# Patient Record
Sex: FEMALE | Employment: FULL TIME | URBAN - METROPOLITAN AREA
[De-identification: names, ages, dates, MRNs, and addresses within clinical notes are randomized per-mention and may not be internally consistent; named-entity substitution may affect disease eponyms.]

---

## 2024-10-28 ENCOUNTER — HOSPITAL ENCOUNTER (OUTPATIENT)
Facility: MEDICAL CENTER | Age: 24
End: 2024-10-28
Attending: PREVENTIVE MEDICINE
Payer: COMMERCIAL

## 2024-10-28 ENCOUNTER — EH NON-PROVIDER (OUTPATIENT)
Dept: OCCUPATIONAL MEDICINE | Facility: CLINIC | Age: 24
End: 2024-10-28

## 2024-10-28 DIAGNOSIS — Z02.1 PRE-EMPLOYMENT DRUG SCREENING: ICD-10-CM

## 2024-10-28 DIAGNOSIS — Z02.89 ENCOUNTER FOR OCCUPATIONAL HEALTH ASSESSMENT: ICD-10-CM

## 2024-10-28 LAB
AMP AMPHETAMINE: NORMAL
BAR BARBITURATES: NORMAL
BZO BENZODIAZEPINES: NORMAL
COC COCAINE: NORMAL
INT CON NEG: NORMAL
INT CON POS: NORMAL
MDMA ECSTASY: NORMAL
MET METHAMPHETAMINES: NORMAL
MTD METHADONE: NORMAL
OPI OPIATES: NORMAL
OXY OXYCODONE: NORMAL
PCP PHENCYCLIDINE: NORMAL
POC URINE DRUG SCREEN OCDRS: NEGATIVE
THC: NORMAL

## 2024-10-28 PROCEDURE — 86480 TB TEST CELL IMMUN MEASURE: CPT | Performed by: PREVENTIVE MEDICINE

## 2024-10-28 PROCEDURE — 80305 DRUG TEST PRSMV DIR OPT OBS: CPT | Performed by: PREVENTIVE MEDICINE

## 2024-10-28 PROCEDURE — 86735 MUMPS ANTIBODY: CPT | Performed by: PREVENTIVE MEDICINE

## 2024-10-28 PROCEDURE — 90715 TDAP VACCINE 7 YRS/> IM: CPT | Mod: JZ | Performed by: PREVENTIVE MEDICINE

## 2024-10-28 PROCEDURE — 86765 RUBEOLA ANTIBODY: CPT | Performed by: PREVENTIVE MEDICINE

## 2024-10-28 PROCEDURE — 86762 RUBELLA ANTIBODY: CPT | Performed by: PREVENTIVE MEDICINE

## 2024-10-28 PROCEDURE — 86787 VARICELLA-ZOSTER ANTIBODY: CPT | Performed by: PREVENTIVE MEDICINE

## 2024-10-29 LAB
GAMMA INTERFERON BACKGROUND BLD IA-ACNC: 0.03 IU/ML
M TB IFN-G BLD-IMP: NEGATIVE
M TB IFN-G CD4+ BCKGRND COR BLD-ACNC: 0.14 IU/ML
MITOGEN IGNF BCKGRD COR BLD-ACNC: 6.73 IU/ML
QFT TB2 - NIL TBQ2: 0.13 IU/ML

## 2024-10-30 LAB
MEV IGG SER-ACNC: >300 AU/ML
MUV IGG SER IA-ACNC: <5 AU/ML
RUBV AB SER QL: 118 IU/ML
VZV IGG SER IA-ACNC: 0.4 S/CO

## 2024-11-04 ENCOUNTER — EH NON-PROVIDER (OUTPATIENT)
Dept: OCCUPATIONAL MEDICINE | Facility: CLINIC | Age: 24
End: 2024-11-04

## 2024-11-04 DIAGNOSIS — Z02.89 ENCOUNTER FOR OCCUPATIONAL HEALTH ASSESSMENT: Primary | ICD-10-CM

## 2024-11-04 PROCEDURE — 90716 VAR VACCINE LIVE SUBQ: CPT | Mod: JZ | Performed by: NURSE PRACTITIONER

## 2024-11-04 PROCEDURE — 90471 IMMUNIZATION ADMIN: CPT | Performed by: NURSE PRACTITIONER

## 2024-11-08 ENCOUNTER — TELEPHONE (OUTPATIENT)
Dept: OCCUPATIONAL MEDICINE | Facility: CLINIC | Age: 24
End: 2024-11-08
Payer: COMMERCIAL

## 2025-01-13 ENCOUNTER — APPOINTMENT (OUTPATIENT)
Dept: DERMATOLOGY | Facility: IMAGING CENTER | Age: 25
End: 2025-01-13
Payer: COMMERCIAL

## 2025-01-13 ENCOUNTER — HOSPITAL ENCOUNTER (OUTPATIENT)
Dept: RADIOLOGY | Facility: MEDICAL CENTER | Age: 25
End: 2025-01-13
Payer: COMMERCIAL

## 2025-01-13 ENCOUNTER — HOSPITAL ENCOUNTER (OUTPATIENT)
Facility: MEDICAL CENTER | Age: 25
End: 2025-01-13
Payer: COMMERCIAL

## 2025-01-13 ENCOUNTER — GYNECOLOGY VISIT (OUTPATIENT)
Dept: OBGYN | Facility: CLINIC | Age: 25
End: 2025-01-13
Payer: COMMERCIAL

## 2025-01-13 VITALS
BODY MASS INDEX: 22.44 KG/M2 | DIASTOLIC BLOOD PRESSURE: 70 MMHG | HEIGHT: 67 IN | SYSTOLIC BLOOD PRESSURE: 114 MMHG | WEIGHT: 143 LBS

## 2025-01-13 DIAGNOSIS — Z12.4 SCREENING FOR CERVICAL CANCER: ICD-10-CM

## 2025-01-13 DIAGNOSIS — N39.3 STRESS INCONTINENCE: ICD-10-CM

## 2025-01-13 DIAGNOSIS — Z01.419 WELL WOMAN EXAM: ICD-10-CM

## 2025-01-13 DIAGNOSIS — R10.2 PELVIC PAIN: ICD-10-CM

## 2025-01-13 PROCEDURE — 76830 TRANSVAGINAL US NON-OB: CPT

## 2025-01-13 PROCEDURE — 99385 PREV VISIT NEW AGE 18-39: CPT

## 2025-01-13 PROCEDURE — 88142 CYTOPATH C/V THIN LAYER: CPT

## 2025-01-13 PROCEDURE — 99459 PELVIC EXAMINATION: CPT

## 2025-01-13 NOTE — PROGRESS NOTES
[facility] DERMATOLOGY CLINIC NOTE    No chief complaint on file.       HPI:    Charlene Weaver is a 24 y.o. female here for evaluation of above, DAVIS.     Today notes following skin lesions of concern:     ***) Skin growth on: ***   Duration: ***   Associated symptoms: ***   Prior treatments: ***    No other symptomatic (itching, painful, burning) or changing lesions.       Dermatology History:      - Prior skin cancer risk factors: ***      - Family history of skin cancer: ***     - Prior history of skin cancer: ***       Review of Systems: No fevers, chill. Pertinent positives and negatives above.       Medications, Medical History, Surgical History, Family History & Allergies:  Reviewed in the chart, relevant history noted above.         PHYSICAL EXAM, ASSESSMENT, & PLAN (per problem):   {Exam:95660}      ***       Benign appearing melanocytic nevi   Exam: {neviexam:46062}    Discussed nevi are benign appearing on exam, symmetrical and regular pigment network on dermoscopy   ABCDEs of melanoma discussed, call office for sooner evaluation for concerning changes/growth      Lentigos / lentigines   Exam: scattered light to medium brown macules over photodistributed areas of face, upper shoulders, arms     benign, reassurance   ABCDEs of melanoma discussed       Seborrheic keratosis   Exam: scattered tan to brown verrucous papules and plaques on trunk and extremities     benign, reassurance       Cherry Angiomas   Exam: scattered 1-3mm bright red macules and thin papules on trunk and extremities     benign, reassurance       Skin Cancer Prevention Counseling   Advise regular sun protection/sunscreen use, SPF 30 or greater with broad spectrum coverage need for reapplication every  minutes.   Recommend broad brimmed hats, UPF sun protective clothing when outdoors for extended periods of time   Recommend self checks at home,  ABCDEs of melanoma discussed   Handouts provided at visit ***         Follow up:  {followup:68326}      Ирина Montiel MD   [facility]

## 2025-01-13 NOTE — PROGRESS NOTES
Patient here for annual well woman exam  Patient reports regular cycles .  Patient is not interested in anything for birth control - as she does not do well with hormones.  LMP: 12/30/2024  Last Pap/Results: Thinks during her last pregnancy - denies abnormal history  Sexually Active: Yes -  - one male partner  Birth Control Method: None  Phone: [phone]  Pharmacy: [facility]  Patient does have superficial right lower quadrant pain as well deep penetration pain during intercourse.

## 2025-01-13 NOTE — PROGRESS NOTES
ANNUAL GYNECOLOGY VISIT    Chief Complaint  Annual    Subjective  Charlene Weaver is a 24 y.o. female  Patient's last menstrual period was 2024 (exact date). using nothing for contraception who presents today for Annual Exam. Patient complains of pain in her right pelvic area that is right above her  scar from 2024. She had this pain and a lump right after her . She has noticed the lump is gone but the pain is still there. It is painful when she sits on the toilet, lifts her kids, and sometimes with intercourse.     She is also thinking about getting a BTL and would like some counseling.     She sometimes has some bladder leakage when she sneezes and would like to go to pelvic floor therapy.       Preventive Care   Immunization History   Administered Date(s) Administered    Hepatitis B Vaccine Adolescent/Pediatric 1999, 2000, 2010    MMR Vaccine 2000, 2005    Tdap Vaccine 10/28/2024    Varicella Vaccine Live 2000, 2024       Guardasil HPV vaccine: had them at 13.     Gynecology History and ROS  Current Sexual Activity: yes -    History of sexually transmitted diseases? no  Abnormal discharge? no  Current Contraception:  nothing    Menstrual History  Patient's last menstrual period was 2024 (exact date).  Periods are regular  q 28-30 days, lasting 4-5 days.   Clots or heavy flow: yes clotting  Dysmenorrhea: no  Intermenstrual bleeding/spotting: no  Significant pain with periods:no  Bothersome PMS symptoms: no  Significant Pelvic Pain: no    Pap History  Last pap smear: unsure  History of moderate or severe dysplasia: no    Cancer Risk Assessement:  Family history of:   - Breast cancer: paternal grandmother- pt had BRCA testing, negative    - Ovarian cancer: no   - Uterine cancer: no   - Colon cancer: no    Obstetric History  OB History    Para Term  AB Living   2 1   1 1 2   SAB IAB Ectopic Molar Multiple  Live Births   1       1 2      # Outcome Date GA Lbr Justin/2nd Weight Sex Type Anes PTL Lv   2A  24 36w1d   M CS-LTranv Spinal Y JAVY      Complications: Twins live born in hospital   2B  24 36w1d   M CS-LTranv Spinal Y JAVY      Complications: Twins live born in hospital   1 SAB 10/05/22 7w5d             Birth Comments: D&C for Incomplete        Past Medical History  Past Medical History:   Diagnosis Date    Anemia 2024    After C/S - 2024    History of twin pregnancy in prior pregnancy 2024    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       Past Surgical History  Past Surgical History:   Procedure Laterality Date    PRIMARY C SECTION  2024    Low Transverse - Twin Pregnancy    DILATION AND CURETTAGE  10/25/2022    Incomplete        Social History  Social History     Tobacco Use    Smoking status: Never     Passive exposure: Never    Smokeless tobacco: Never   Vaping Use    Vaping status: Never Used   Substance Use Topics    Alcohol use: Yes     Alcohol/week: 0.6 oz     Types: 1 Glasses of wine per week     Comment: Occasional 1 per month    Drug use: Never        Family History  Family History   Problem Relation Age of Onset    No Known Problems Mother     No Known Problems Father     Breast Cancer Paternal Grandmother        Home Medications  No current outpatient medications on file.       Allergies/Reactions  Allergies   Allergen Reactions    Amoxicillin     Penicillins     Ibuprofen     Prednisone        ROS  Positive ROS: right side pelvic pain  Gen: no fevers or chills, no significant weight loss or gain, excessive fatigue  Respiratory:  no cough or dyspnea  Cardiac:  no chest pain, no palpitations, no syncope  Breast: no breast discharge, pain, lump or skin changes  GI:  no heartburn, no abdominal pain, no nausea or vomiting  Urinary: no dysuria, urgency, frequency, incontinence   Psych: no depression or anxiety  Neuro: no migraines with aura,  "fainting spells, numbness or tingling  Extremities: no joint pain, persistently swollen ankles, recurrent leg cramps      Physical Examination:  Vital Signs: /70 (BP Location: Left arm, Patient Position: Sitting, BP Cuff Size: Adult)   Ht 5' 7\"   Wt 143 lb   LMP 12/30/2024 (Exact Date)   BMI 22.40 kg/m²       Constitutional: The patient is well developed and well nourished.  Psychiatric: Patient is oriented to time place and person.   Skin: No rash observed.  Neck: Appears symmetric. Thyroid normal size  Respiratory: normal effort  Breast: Inspection reveals no asymetry or nipple discharge, no skin thickening, dimpling or erythema.  Palpation demonstrates no masses.  Abdomen: Soft, painful in right pelvic region  Pelvic Exam:      Vulva: external female genitalia are normal in appearance. No lesions     Urethra - no lesions, no erythema     Vagina: moist, pink, normal ruggae     Cervix: pink, smooth, no lesions, no CMT     Uterus - non-tender, normal size, shape, contour, mobile, anteverted     Ovaries: non-tender, no appreciable masses    Pap Smear performed: Yes    Chaperone Present: Hope Hoffman MA   Extremeties: Legs are symmetric and without tenderness. There is no edema present.        Assessment & Plan  Charlene Weaver is a 24 y.o. female who presents today for Annual Gyn Exam.     1. Well woman exam  Anticipatory guidance given. Encouraged adequate water intake, healthy diet, regular exercise. Educated on Pap smear screening and guidelines for age per ACOG and ASSCP. Discussed safe sex, STI prevention, contraception/family planning. Self breast exam taught.     - THINPREP PAP,REFLEX HPV ON ASC-US ONLY; collected  - Referral to Physical Therapy  - US-PELVIC COMPLETE (TRANSABDOMINAL/TRANSVAGINAL) (COMBO); Future    2. Screening for cervical cancer    - THINPREP PAP,REFLEX HPV ON ASC-US ONLY; colelcted    3. Pelvic pain    - Referral to Physical Therapy  - US-PELVIC COMPLETE " (TRANSABDOMINAL/TRANSVAGINAL) (COMBO); Future    4. Stress incontinence    - US-PELVIC COMPLETE (TRANSABDOMINAL/TRANSVAGINAL) (COMBO); Future            Return: Annually or PRN    TOMMIE Soto.  [facility]

## 2025-01-16 LAB — THINPREP PAP, CYTOLOGY NL11781: NORMAL

## 2025-02-24 ENCOUNTER — APPOINTMENT (OUTPATIENT)
Dept: OBGYN | Facility: CLINIC | Age: 25
End: 2025-02-24
Payer: COMMERCIAL

## 2025-02-28 ENCOUNTER — APPOINTMENT (OUTPATIENT)
Dept: MEDICAL GROUP | Facility: IMAGING CENTER | Age: 25
End: 2025-02-28
Payer: COMMERCIAL

## 2025-02-28 ENCOUNTER — APPOINTMENT (OUTPATIENT)
Dept: DERMATOLOGY | Facility: IMAGING CENTER | Age: 25
End: 2025-02-28
Payer: COMMERCIAL

## 2025-02-28 VITALS
WEIGHT: 143 LBS | HEIGHT: 67 IN | BODY MASS INDEX: 22.44 KG/M2 | RESPIRATION RATE: 14 BRPM | HEART RATE: 88 BPM | TEMPERATURE: 97.9 F | SYSTOLIC BLOOD PRESSURE: 108 MMHG | DIASTOLIC BLOOD PRESSURE: 60 MMHG | OXYGEN SATURATION: 98 %

## 2025-02-28 DIAGNOSIS — Z00.00 HEALTHCARE MAINTENANCE: ICD-10-CM

## 2025-02-28 DIAGNOSIS — Z30.09 BIRTH CONTROL COUNSELING: ICD-10-CM

## 2025-02-28 DIAGNOSIS — D22.9 MULTIPLE NEVI: ICD-10-CM

## 2025-02-28 DIAGNOSIS — Z13.220 SCREENING FOR LIPID DISORDERS: ICD-10-CM

## 2025-02-28 DIAGNOSIS — Z86.39 HISTORY OF IRON DEFICIENCY: ICD-10-CM

## 2025-02-28 DIAGNOSIS — Z13.21 ENCOUNTER FOR VITAMIN DEFICIENCY SCREENING: ICD-10-CM

## 2025-02-28 DIAGNOSIS — Z13.29 SCREENING FOR THYROID DISORDER: ICD-10-CM

## 2025-02-28 DIAGNOSIS — R23.3 EASY BRUISING: ICD-10-CM

## 2025-02-28 DIAGNOSIS — R10.2 PELVIC PAIN: ICD-10-CM

## 2025-02-28 DIAGNOSIS — L57.8 OTHER SKIN CHANGES DUE TO CHRONIC EXPOSURE TO NONIONIZING RADIATION: ICD-10-CM

## 2025-02-28 DIAGNOSIS — Z13.1 SCREENING FOR DIABETES MELLITUS: ICD-10-CM

## 2025-02-28 DIAGNOSIS — Z12.83 SKIN CANCER SCREENING: ICD-10-CM

## 2025-02-28 DIAGNOSIS — Z11.59 NEED FOR HEPATITIS C SCREENING TEST: ICD-10-CM

## 2025-02-28 DIAGNOSIS — Z11.4 SCREENING FOR HIV (HUMAN IMMUNODEFICIENCY VIRUS): ICD-10-CM

## 2025-02-28 DIAGNOSIS — D18.01 CHERRY ANGIOMA: ICD-10-CM

## 2025-02-28 DIAGNOSIS — D48.5 NEOPLASM OF UNCERTAIN BEHAVIOR OF SKIN: ICD-10-CM

## 2025-02-28 ASSESSMENT — PATIENT HEALTH QUESTIONNAIRE - PHQ9: CLINICAL INTERPRETATION OF PHQ2 SCORE: 0

## 2025-02-28 NOTE — PROGRESS NOTES
Subjective:       CC:   Chief Complaint   Patient presents with    Establish Care     No pcp       Requesting Labs     Full panel, pt notes bruising easily      Other     Lower right pain near front hip        HPI:     Verbal consent was acquired by the patient to use [employer] ambient listening note generation during this visit Keshav Chang is a 24 y.o. female is new to me and is here today to establish care. Previous PCP was none.  Pt would like to discuss the following today    History of Present Illness    History of anemia  The patient has a history of mild anemia, managed intermittently with iron supplementation. She is uncertain about the current severity of her anemia and seeks guidance on whether additional calcium or other nutrients are necessary.    Easy bruising  She reports a propensity for easy bruising, even with minor trauma such as contact with a couch. These ecchymoses persist for approximately three weeks before resolving. There is no reported family history of bleeding disorders, and she notes that minor injuries result in rapid hemostasis.     She has no known diagnoses of any diseases and is not currently on any prescribed medications. She takes vitamins intermittently.    Pelvic pain  Her surgical history includes a  section and D & C procedure, with no other surgical interventions. Post- section, she experienced pain on her right side, which was more pronounced during the first one to two weeks postoperatively. After one to two months, she identified a palpable mass, approximately the size of a tennis ball, on her right side. Her obstetrician-gynecologist (OB-GYN), who performed the surgery, suggested it might be scar tissue and recommended monitoring. The mass has since resolved. A recent ultrasound conducted one month ago was normal. She reports discomfort during Valsalva maneuvers, such as urination or defecation, but denies dysuria. She also reports dyspareunia.  She has refrained from exercising due to concerns about exacerbating her symptoms. She has no upcoming appointments with her gynecologist. She has been referred to physical therapy and has an appointment scheduled for 2025. She performs Kegel exercises intermittently. She feels her abdominal muscles are not normal and suspects some degree of diastasis recti. She perceives a downward shift of her organs and feels that her anatomy has not fully returned to its preoperative state post- section.     Supplemental Information  The patient is considering tubal ligation as a permanent contraceptive method. She has previously used Depo-Provera and oral contraceptives but experienced hormonal fluctuations and is currently not using any hormonal birth control. She uses condoms for contraception. She completed the HPV vaccine series between the ages of 10 and 12. She does not receive influenza vaccinations. The patient recently delivered twins, born four weeks prematurely, who did not require a  intensive care unit (NICU) stay. The delivery occurred last January. Throughout the pregnancy, she experienced significant nausea and vomiting, resulting in a weight loss of 40 pounds. She weighed 130 pounds pre-pregnancy and had regained up to 120 pounds by the time of delivery. She experienced daily emesis until delivery and found antiemetic medications ineffective.     SOCIAL HISTORY  - Drinks alcohol intermittently, about once a month or less  - Does not use drugs, including marijuana  - Does not smoke or vape    FAMILY HISTORY  - Paternal grandfather had a heart attack in his 50s and has diabetes  - Paternal grandmother had breast cancer and is now cleared  - No family history of bleeding disorders           ROS:   See HPI    Patient Active Problem List    Diagnosis Date Noted    Easy bruising 2025    Pelvic pain 2025    History of iron deficiency 2025       Past Medical History:   Diagnosis  Date    Anemia 01/18/2024    After C/S - 1/2024    History of twin pregnancy in prior pregnancy 01/07/2024    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     No current facility-administered medications for this visit.       Allergies as of 02/28/2025 - Reviewed 02/28/2025   Allergen Reaction Noted    Amoxicillin  01/09/2025    Penicillins  01/09/2025    Ibuprofen  01/09/2025    Prednisone  01/09/2025       Social History     Socioeconomic History    Marital status:      Spouse name: Not on file    Number of children: Not on file    Years of education: Not on file    Highest education level: Not on file   Occupational History    Not on file   Tobacco Use    Smoking status: Never     Passive exposure: Never    Smokeless tobacco: Never   Vaping Use    Vaping status: Never Used   Substance and Sexual Activity    Alcohol use: Yes     Alcohol/week: 0.6 oz     Types: 1 Glasses of wine per week     Comment: Occasional 1 per month    Drug use: Never    Sexual activity: Yes     Partners: Male     Birth control/protection: None   Other Topics Concern    Not on file   Social History Narrative    Not on file     Social Drivers of Health     Financial Resource Strain: Low Risk  (12/12/2023)    Received from [facility]    Overall Financial Resource Strain (CARDIA)     Difficulty of Paying Living Expenses: Not hard at all   Food Insecurity: No Food Insecurity (1/7/2024)    Received from [facility]    Hunger Vital Sign     Worried About Running Out of Food in the Last Year: Never true     Ran Out of Food in the Last Year: Never true   Transportation Needs: No Transportation Needs (1/7/2024)    Received from [facility]    PRAPARE - Transportation     Lack of Transportation (Medical): No     Lack of Transportation (Non-Medical): No   Physical Activity: Not on file   Stress: No Stress Concern Present (12/12/2023)    Received from  "[facility] [place] of Occupational [facility] - Occupational Stress Questionnaire     Feeling of Stress : Not at all   Social Connections: Not on file   Intimate Partner Violence: Not At Risk (2024)    Received from [facility]    Humiliation, Afraid, Rape, and Kick questionnaire     Fear of Current or Ex-Partner: No     Emotionally Abused: No     Physically Abused: No     Sexually Abused: No   Housing Stability: Not on file       Family History   Problem Relation Age of Onset    No Known Problems Mother     No Known Problems Father     No Known Problems Sister     No Known Problems Sister     No Known Problems Brother     Breast Cancer Paternal Grandmother     Diabetes Paternal Grandfather     Heart Disease Paternal Grandfather        Past Surgical History:   Procedure Laterality Date    PRIMARY C SECTION  2024    Low Transverse - Twin Pregnancy    DILATION AND CURETTAGE  10/25/2022    Incomplete            Objective:     /60 (BP Location: Right arm, Patient Position: Sitting, BP Cuff Size: Adult)   Pulse 88   Temp 36.6 °C (97.9 °F) (Temporal)   Resp 14   Ht 1.702 m (5' 7\")   Wt 64.9 kg (143 lb)   LMP 2025 (Exact Date)   SpO2 98%   BMI 22.40 kg/m²     Physical Exam  Constitutional:       General: She is not in acute distress.     Appearance: Normal appearance.   HENT:      Head: Normocephalic and atraumatic.   Eyes:      General: No scleral icterus.        Right eye: No discharge.         Left eye: No discharge.   Neck:      Thyroid: No thyroid mass or thyromegaly.      Vascular: No carotid bruit.   Cardiovascular:      Rate and Rhythm: Normal rate and regular rhythm.      Heart sounds: Normal heart sounds. No murmur heard.  Pulmonary:      Effort: Pulmonary effort is normal.      Breath sounds: Normal breath sounds. No wheezing.   Abdominal:      General: Bowel sounds are normal. There is no distension.      Palpations: Abdomen is soft. " There is no mass.      Tenderness: There is no abdominal tenderness.   Musculoskeletal:         General: No swelling. Normal range of motion.      Cervical back: Normal range of motion and neck supple.   Lymphadenopathy:      Cervical: No cervical adenopathy.   Skin:     General: Skin is warm and dry.   Neurological:      General: No focal deficit present.      Mental Status: She is alert and oriented to person, place, and time.      Gait: Gait normal.   Psychiatric:         Mood and Affect: Mood normal.         Behavior: Behavior normal.         Thought Content: Thought content normal.         Judgment: Judgment normal.            Assessment and Plan:     Assessment & Plan      1. Easy bruising  New diagnosis. Stable. She reports bruising easily and prolonged duration of bruises. CBC, Iron panel, PT and INR tests will be conducted.    - CBC WITH DIFFERENTIAL; Future  - Prothrombin Time; Future  - APTT; Future  - IRON/TOTAL IRON BIND; Future  - FERRITIN; Future    2. Pelvic pain  Chronic, stable.  Patient has been experiencing pain since a  a year ago.  Patient was evaluated by gynecology and was referred for pelvic floor therapy.  She recently completed a pelvic ultrasound which is unremarkable. She is advised to perform Kegel exercises and yoga daily. Warm compresses and Tylenol as needed are recommended for pain relief. If symptoms persist post-therapy, an MRI may be considered.    3. Need for hepatitis C screening test  - HEP C VIRUS ANTIBODY; Future    4. Screening for HIV (human immunodeficiency virus)  - HIV AG/AB COMBO ASSAY SCREENING; Future    5. Encounter for vitamin deficiency screening  - VITAMIN D 25-HYDROXY  - VIT B12,  FOLIC ACID    6. History of iron deficiency  - IRON/TOTAL IRON BIND; Future  - FERRITIN; Future    7. Screening for thyroid disorder  - TSH WITH REFLEX TO FT4; Future    8. Screening for lipid disorders  - Lipid Profile; Future    9. Screening for diabetes mellitus  -  HEMOGLOBIN A1C; Future  - Comp Metabolic Panel; Future    10. Birth control counseling  Patient is currently using condoms.  She is considering a tubal ligation.  She is not interested in hormonal birth control at this time.  Informed patient to consult with her gynecologist if she decides to proceed with a tubal ligation.    11. Healthcare maintenance  She is up-to-date with cervical cancer screening.  Per patient she has completed HPV series.  She declined influenza vaccine.  Otherwise, she is up-to-date.            Pt agreed with treatment plan and verbalized understanding.       Return for f/u after completing tests.     Please note that this dictation was created using voice recognition software. I have made every reasonable attempt to correct obvious errors, but I expect that there are errors of grammar and possibly content that I did not discover before finalizing the note.    Asha Foster PA-C  [facility]

## 2025-02-28 NOTE — PROGRESS NOTES
[facility] DERMATOLOGY CLINIC NOTE    Chief Complaint   Patient presents with    Establish Care     TBE        HPI:    Charlene Weaver is a 24 y.o. female here for evaluation of above, DAVIS.     Today notes following concern:   - Patient has freckles and moles of concerns on L shoulder, was itchy a few weeks ago. And changing mole on L breast.    No other symptomatic (itching, painful, burning) or changing lesions.       Dermatology History:      - Prior history of skin cancer: none     - Family history of skin cancer: none      Review of Systems: No fevers, chill. Pertinent positives and negatives above.       Medications, Medical History, Surgical History, Family History & Allergies:  Reviewed in the chart, relevant history noted above.       PHYSICAL EXAM  Full body skin check of the scalp, face, neck, trunk, extremities was performed. Patient declined exam of groin and Patient with nail polish on  Skin type 1    - L shoulder with 4mm brown papule, ddx r/o atypical nevus  - L medial breast with 5mm tan macule, ddx r/o atypical nevus   - scattered melanotic macules and papules on the trunk and extremities  - scattered tan macules without surface scale on sun exposed areas of face, neck, shoulders, arms  - scattered 2-3mm red papules on trunk, extremities    ASSESSMENT & PLAN    # Neoplasm of uncertain behavior  Shave biopsy performed as below:  Location:   - L shoulder with 4mm brown papule, ddx r/o atypical nevus  - L medial breast with 5mm tan macule, ddx r/o atypical nevus   After informed written consent was obtained, using alcohol pad for cleansing and 1% Lidocaine with epinephrine for anesthetic, with sterile technique and Dermablade, a shave biopsy was used to obtain a biopsy specimen of the lesion. Hemostasis was obtained by pressure and aluminum chloride. Dressing is applied, and wound care instructions provided. Be alert for any signs of cutaneous infection. The specimen is labeled and sent to pathology  for evaluation. The procedure was well tolerated without complications.      # Melanotic nevi  - clinically benign appearing today  - ABCDEs of atypical appearing moles discussed.     # Lentigines  - discussed this is a result of sun exposure, photodamage  - regular sun protective practices with hats/clothing, SPF 30+ with regular application and reapplication recommended    # Cherry angioma  - reassure benign, no treatment needed      Skin Cancer Prevention Counseling   Advise regular sun protection/sunscreen use, SPF 30 or greater, recommend mineral sunscreen, and to reapply every  minutes.   Recommend broad brimmed hats, UPF sun protective clothing when outdoors for extended periods of time   Recommend self checks at home,  ABCDEs of melanoma discussed       Return in about 1 year (around 2/28/2026) for Skin check.      Malka Dawkins MD  [facility]

## 2025-03-01 NOTE — PATIENT INSTRUCTIONS
Thank you for choosing [facility]. It was a pleasure meeting you today.     Take care!  Asha AntonioGe[facility]

## 2025-03-07 ENCOUNTER — RESULTS FOLLOW-UP (OUTPATIENT)
Dept: DERMATOLOGY | Facility: IMAGING CENTER | Age: 25
End: 2025-03-07
Payer: COMMERCIAL

## 2025-03-21 ENCOUNTER — TELEMEDICINE (OUTPATIENT)
Dept: MEDICAL GROUP | Facility: IMAGING CENTER | Age: 25
End: 2025-03-21
Payer: COMMERCIAL

## 2025-03-21 VITALS — WEIGHT: 140 LBS | BODY MASS INDEX: 21.97 KG/M2 | HEIGHT: 67 IN

## 2025-03-21 DIAGNOSIS — Z02.89 ENCOUNTER FOR COMPLETION OF FORM WITH PATIENT: ICD-10-CM

## 2025-03-21 PROCEDURE — 7101 PR PHYSICAL: Performed by: STUDENT IN AN ORGANIZED HEALTH CARE EDUCATION/TRAINING PROGRAM

## 2025-03-21 NOTE — PROGRESS NOTES
"Virtual Visit: Established Patient   This visit was conducted via Teams using secure and encrypted videoconferencing technology. The patient was in a private location in the [facility].    The patient's identity was confirmed and verbal consent was obtained for this virtual visit.    Subjective:   CC:   Chief Complaint   Patient presents with    Paperwork     Requesting work accomodation        Charlene Weaver is a 24 y.o. female presenting for evaluation and management of:    Work accomodation: she is currently working from home. She's allowed a 30 min lunch and 2 15-min breaks during her shift. Due to her pelvic issues, sometimes she'll need to be in the bathroom for a longer amount of time. She's still waiting for pelvic floor therapy. She requesting a 10 min break during her shift to use the bathroom.  She has appointment tomorrow for blood work.    ROS:   See HPI      Medications, allergies, past medical history, family history, surgical history, and social history documented in chart and reviewed by me.        Objective:   Ht 1.702 m (5' 7\") Comment: pt reported  Wt 63.5 kg (140 lb) Comment: pt reported  LMP 02/24/2025 (Exact Date)   BMI 21.93 kg/m²     Physical Exam:   Constitutional: Alert, no distress, well-groomed.  Skin: No rashes in visible areas.  Eyes: Round. Conjunctiva clear, lids normal. No icterus.   ENMT: Lips pink without lesions, moist mucous membranes. Phonation normal.  Neck: No visible masses or thyromegaly.   Respiratory: Unlabored respiratory effort, no cough or audible wheeze.  Psych: Alert and oriented x3, normal affect and mood.       Assessment and Plan:     1. Encounter for completion of form with patient  Letter requesting an extra 10 min break was provided as requested by pt.  See letter under media.         Return if symptoms worsen or fail to improve.     Please note that this dictation was created using voice recognition software. I have made every reasonable attempt " to correct obvious errors, but I expect that there are errors of grammar and possibly content that I did not discover before finalizing the note.    Asha Foster PA-C  [facility]

## 2025-03-21 NOTE — LETTER
March 21, 2025    To whom it may concern,     Charlene Weaver is my patient. Due to her medical history, it is recommended to allow Mrs. Weaver to have an extra 10 minute break during her shift to use the bathroom. If you have questions or need additional information please contact me.    Sincerely,  Asha Foster P.A.-C.

## 2025-03-22 ENCOUNTER — HOSPITAL ENCOUNTER (OUTPATIENT)
Dept: LAB | Facility: MEDICAL CENTER | Age: 25
End: 2025-03-22
Attending: STUDENT IN AN ORGANIZED HEALTH CARE EDUCATION/TRAINING PROGRAM
Payer: COMMERCIAL

## 2025-03-22 DIAGNOSIS — Z13.29 SCREENING FOR THYROID DISORDER: ICD-10-CM

## 2025-03-22 DIAGNOSIS — R23.3 EASY BRUISING: ICD-10-CM

## 2025-03-22 DIAGNOSIS — Z13.220 SCREENING FOR LIPID DISORDERS: ICD-10-CM

## 2025-03-22 DIAGNOSIS — Z13.1 SCREENING FOR DIABETES MELLITUS: ICD-10-CM

## 2025-03-22 DIAGNOSIS — Z11.4 SCREENING FOR HIV (HUMAN IMMUNODEFICIENCY VIRUS): ICD-10-CM

## 2025-03-22 DIAGNOSIS — Z11.59 NEED FOR HEPATITIS C SCREENING TEST: ICD-10-CM

## 2025-03-22 LAB
ALBUMIN SERPL BCP-MCNC: 4.5 G/DL (ref 3.2–4.9)
ALBUMIN/GLOB SERPL: 1.3 G/DL
ALP SERPL-CCNC: 57 U/L (ref 30–99)
ALT SERPL-CCNC: 12 U/L (ref 2–50)
ANION GAP SERPL CALC-SCNC: 13 MMOL/L (ref 7–16)
AST SERPL-CCNC: 19 U/L (ref 12–45)
BASOPHILS # BLD AUTO: 0.5 % (ref 0–1.8)
BASOPHILS # BLD: 0.03 K/UL (ref 0–0.12)
BILIRUB SERPL-MCNC: 0.5 MG/DL (ref 0.1–1.5)
BUN SERPL-MCNC: 7 MG/DL (ref 8–22)
CALCIUM ALBUM COR SERPL-MCNC: 8.9 MG/DL (ref 8.5–10.5)
CALCIUM SERPL-MCNC: 9.3 MG/DL (ref 8.5–10.5)
CHLORIDE SERPL-SCNC: 105 MMOL/L (ref 96–112)
CHOLEST SERPL-MCNC: 167 MG/DL (ref 100–199)
CO2 SERPL-SCNC: 20 MMOL/L (ref 20–33)
CREAT SERPL-MCNC: 0.81 MG/DL (ref 0.5–1.4)
EOSINOPHIL # BLD AUTO: 0.03 K/UL (ref 0–0.51)
EOSINOPHIL NFR BLD: 0.5 % (ref 0–6.9)
ERYTHROCYTE [DISTWIDTH] IN BLOOD BY AUTOMATED COUNT: 49.9 FL (ref 35.9–50)
EST. AVERAGE GLUCOSE BLD GHB EST-MCNC: 111 MG/DL
FERRITIN SERPL-MCNC: 21.8 NG/ML (ref 10–291)
GFR SERPLBLD CREATININE-BSD FMLA CKD-EPI: 103 ML/MIN/1.73 M 2
GLOBULIN SER CALC-MCNC: 3.4 G/DL (ref 1.9–3.5)
GLUCOSE SERPL-MCNC: 93 MG/DL (ref 65–99)
HBA1C MFR BLD: 5.5 % (ref 4–5.6)
HCT VFR BLD AUTO: 45.7 % (ref 37–47)
HCV AB SER QL: NORMAL
HDLC SERPL-MCNC: 60 MG/DL
HGB BLD-MCNC: 14.5 G/DL (ref 12–16)
HIV 1+2 AB+HIV1 P24 AG SERPL QL IA: NORMAL
IMM GRANULOCYTES # BLD AUTO: 0.01 K/UL (ref 0–0.11)
IMM GRANULOCYTES NFR BLD AUTO: 0.2 % (ref 0–0.9)
IRON SATN MFR SERPL: 47 % (ref 15–55)
IRON SERPL-MCNC: 158 UG/DL (ref 40–170)
LDLC SERPL CALC-MCNC: 97 MG/DL
LYMPHOCYTES # BLD AUTO: 1.68 K/UL (ref 1–4.8)
LYMPHOCYTES NFR BLD: 29 % (ref 22–41)
MCH RBC QN AUTO: 29 PG (ref 27–33)
MCHC RBC AUTO-ENTMCNC: 31.7 G/DL (ref 32.2–35.5)
MCV RBC AUTO: 91.4 FL (ref 81.4–97.8)
MONOCYTES # BLD AUTO: 0.46 K/UL (ref 0–0.85)
MONOCYTES NFR BLD AUTO: 7.9 % (ref 0–13.4)
NEUTROPHILS # BLD AUTO: 3.59 K/UL (ref 1.82–7.42)
NEUTROPHILS NFR BLD: 61.9 % (ref 44–72)
NRBC # BLD AUTO: 0 K/UL
NRBC BLD-RTO: 0 /100 WBC (ref 0–0.2)
PLATELET # BLD AUTO: 243 K/UL (ref 164–446)
PMV BLD AUTO: 12.5 FL (ref 9–12.9)
POTASSIUM SERPL-SCNC: 4 MMOL/L (ref 3.6–5.5)
PROT SERPL-MCNC: 7.9 G/DL (ref 6–8.2)
RBC # BLD AUTO: 5 M/UL (ref 4.2–5.4)
SODIUM SERPL-SCNC: 138 MMOL/L (ref 135–145)
TIBC SERPL-MCNC: 336 UG/DL (ref 250–450)
TRIGL SERPL-MCNC: 52 MG/DL (ref 0–149)
TSH SERPL DL<=0.005 MIU/L-ACNC: 1.66 UIU/ML (ref 0.38–5.33)
UIBC SERPL-MCNC: 178 UG/DL (ref 110–370)
WBC # BLD AUTO: 5.8 K/UL (ref 4.8–10.8)

## 2025-03-22 PROCEDURE — 80061 LIPID PANEL: CPT

## 2025-03-22 PROCEDURE — 85610 PROTHROMBIN TIME: CPT

## 2025-03-22 PROCEDURE — 84443 ASSAY THYROID STIM HORMONE: CPT

## 2025-03-22 PROCEDURE — 85730 THROMBOPLASTIN TIME PARTIAL: CPT

## 2025-03-22 PROCEDURE — 80053 COMPREHEN METABOLIC PANEL: CPT

## 2025-03-22 PROCEDURE — 82728 ASSAY OF FERRITIN: CPT

## 2025-03-22 PROCEDURE — 85025 COMPLETE CBC W/AUTO DIFF WBC: CPT

## 2025-03-22 PROCEDURE — 86803 HEPATITIS C AB TEST: CPT

## 2025-03-22 PROCEDURE — 36415 COLL VENOUS BLD VENIPUNCTURE: CPT

## 2025-03-22 PROCEDURE — 87389 HIV-1 AG W/HIV-1&-2 AB AG IA: CPT

## 2025-03-22 PROCEDURE — 83550 IRON BINDING TEST: CPT

## 2025-03-22 PROCEDURE — 83036 HEMOGLOBIN GLYCOSYLATED A1C: CPT

## 2025-03-22 PROCEDURE — 83540 ASSAY OF IRON: CPT

## 2025-03-24 ENCOUNTER — RESULTS FOLLOW-UP (OUTPATIENT)
Dept: MEDICAL GROUP | Facility: IMAGING CENTER | Age: 25
End: 2025-03-24
Payer: COMMERCIAL

## 2025-03-24 LAB
APTT PPP: 32.2 SEC (ref 24.7–36)
INR PPP: 1.08 (ref 0.87–1.13)
PROTHROMBIN TIME: 14 SEC (ref 12–14.6)

## 2025-03-25 ENCOUNTER — PATIENT MESSAGE (OUTPATIENT)
Dept: MEDICAL GROUP | Facility: IMAGING CENTER | Age: 25
End: 2025-03-25
Payer: COMMERCIAL

## 2025-03-25 ENCOUNTER — TELEPHONE (OUTPATIENT)
Dept: MEDICAL GROUP | Facility: IMAGING CENTER | Age: 25
End: 2025-03-25
Payer: COMMERCIAL

## 2025-03-25 NOTE — TELEPHONE ENCOUNTER
Phone Number Called: [phone] (home)         Call outcome: Left detailed message for patient. Informed to call back with any additional questions.    Message: Hello This is medical assistant Flores from [employer], I'm just calling with some question from your provider on some paper work we received. If you could give us a call back at [phone] and ask from [employer]. Thank you so much and have a good afternoon. Bye. -END Message.     Message From Provider: Please clarify with pt if in addition to the accommodation we discussed, she is also seeking intermittent leave

## 2025-04-04 ENCOUNTER — TELEMEDICINE (OUTPATIENT)
Dept: MEDICAL GROUP | Facility: IMAGING CENTER | Age: 25
End: 2025-04-04
Payer: COMMERCIAL

## 2025-04-04 VITALS — HEIGHT: 66 IN | BODY MASS INDEX: 22.5 KG/M2 | WEIGHT: 140 LBS

## 2025-04-04 DIAGNOSIS — M79.671 RIGHT FOOT PAIN: ICD-10-CM

## 2025-04-04 DIAGNOSIS — M79.674 TOE PAIN, RIGHT: ICD-10-CM

## 2025-04-04 PROCEDURE — 99214 OFFICE O/P EST MOD 30 MIN: CPT | Mod: 95 | Performed by: INTERNAL MEDICINE

## 2025-04-04 ASSESSMENT — FIBROSIS 4 INDEX: FIB4 SCORE: 0.56

## 2025-04-04 NOTE — PROGRESS NOTES
"Virtual Visit: Established Patient   This visit was conducted via Teams using secure and encrypted videoconferencing technology.   The patient was in their home in the [facility].    The patient's identity was confirmed and verbal consent was obtained for this virtual visit.     Subjective:   CC:   Chief Complaint   Patient presents with    Other     Xray for foot, poss broken toe       Charlene Weaver is a 24 y.o. female presenting for evaluation and management of:    The patient reports an injury to her right foot and right 5th toe after jamming it against a door frame. She is experiencing pain, slight edema, and bruising in the right 5th toe and the adjacent right dorsal foot. She has been managing the symptoms with icing, elevation, and ibuprofen. The patient would like to undergo imaging tests to ensure there is no fracture.      ROS   Per HPI    Current medicines (including changes today)  No current outpatient medications on file.     No current facility-administered medications for this visit.       Patient Active Problem List    Diagnosis Date Noted    Easy bruising 02/28/2025    Pelvic pain 02/28/2025    History of iron deficiency 02/28/2025        Objective:   Ht 1.676 m (5' 6\")   Wt 63.5 kg (140 lb)   BMI 22.60 kg/m²     Physical Exam:  Constitutional: Alert, no distress, well-groomed.  Skin: No rashes in visible areas.  Eye: Round. Conjunctiva clear, lids normal. No icterus.   ENMT: Lips pink without lesions, good dentition, moist mucous membranes. Phonation normal.  Neck: No masses, no thyromegaly. Moves freely without pain.  Respiratory: Unlabored respiratory effort, no cough or audible wheeze  Psych: Alert and oriented x3, normal affect and mood.     Right 5th toe and  adjacent right dorsal foot slight edema and bruising    Assessment and Plan:   The following treatment plan was discussed:     1. Toe pain, right  - DX-Toe(s) 3 Views RIGHT; Future    2. Right foot pain  - DX-FOOT-COMPLETE " 3+ RIGHT; Future    Rest, Ice, Elevation, Compression  Tylenol or ibuprofen as needed  We will obtain x rays and consider orthopedics referral if indicated    Follow-up: Return if symptoms worsen or fail to improve.

## 2025-05-14 ENCOUNTER — OFFICE VISIT (OUTPATIENT)
Dept: DERMATOLOGY | Facility: IMAGING CENTER | Age: 25
End: 2025-05-14
Payer: COMMERCIAL

## 2025-05-14 DIAGNOSIS — D17.9 LIPOMA, UNSPECIFIED SITE: Primary | ICD-10-CM

## 2025-05-14 PROCEDURE — 99213 OFFICE O/P EST LOW 20 MIN: CPT | Performed by: STUDENT IN AN ORGANIZED HEALTH CARE EDUCATION/TRAINING PROGRAM

## 2025-05-14 NOTE — PROGRESS NOTES
"[facility] NOTE    Chief Complaint   Patient presents with    Skin Lesion          HPI:    Charlene Weaver is a 24 y.o. female presenting today for evaluation of skin growths.     1.) Skin growth on: back   Duration: many years    Associated symptoms: intermittent itching, becomes irritated occasionally when moving shoulder blade, patient describes the spot feeling \"hot\"   Prior treatments: None  Patient believes this spot may be a cyst       No other symptomatic (itching, painful, burning) or changing lesions.       Dermatology History:      - Skin cancer risk factors: none       - Family history of skin cancer: None     - Prior skin cancers: None       Review of Systems: No fevers, chill. Pertinent positives and negatives above.       Medications, Medical History, Surgical History, Family History & Allergies:  Reviewed in the chart, relevant history noted above.         PHYSICAL EXAM, ASSESSMENT, & PLAN (per problem):   A focused skin exam was performed including the affected areas of the back. Notable findings on exam today listed below and/or in assessment/plan.       Lipoma vs other cyst, left upper back -- possibly subscapular lipoma  Exam: ill defined mobile subcutaneous mass at inferior border of left lower scapula    - discussed possible ddx, may represent lipoma although is difficult to palpate on exam and feels deeper-seated   - given has been present 10+ years, not growing over time, not painful -benign, reassurance   - if desires removal in future or increasing symptoms, recommend ultrasound soft tissue ordered to better characterize lesion prior to surgery - will send mychart message if desires   - if she desires surgical removal in future due to symptoms (itch, growth, or pain, etc), given depth, size, feel would be better suited for general surgery excision           Follow up: No follow-ups on file.        Ирина Montiel MD   [facility]    "